# Patient Record
Sex: FEMALE | Race: WHITE | NOT HISPANIC OR LATINO | Employment: FULL TIME | URBAN - METROPOLITAN AREA
[De-identification: names, ages, dates, MRNs, and addresses within clinical notes are randomized per-mention and may not be internally consistent; named-entity substitution may affect disease eponyms.]

---

## 2018-08-06 ENCOUNTER — OFFICE VISIT (OUTPATIENT)
Dept: MEDICAL GROUP | Age: 18
End: 2018-08-06
Payer: COMMERCIAL

## 2018-08-06 VITALS
BODY MASS INDEX: 20.62 KG/M2 | HEART RATE: 87 BPM | HEIGHT: 61 IN | SYSTOLIC BLOOD PRESSURE: 114 MMHG | WEIGHT: 109.2 LBS | DIASTOLIC BLOOD PRESSURE: 80 MMHG | OXYGEN SATURATION: 96 % | TEMPERATURE: 98.3 F

## 2018-08-06 DIAGNOSIS — R11.2 NON-INTRACTABLE VOMITING WITH NAUSEA, UNSPECIFIED VOMITING TYPE: ICD-10-CM

## 2018-08-06 DIAGNOSIS — F43.10 PTSD (POST-TRAUMATIC STRESS DISORDER): ICD-10-CM

## 2018-08-06 DIAGNOSIS — R63.0 ANOREXIA: ICD-10-CM

## 2018-08-06 DIAGNOSIS — Z13.220 LIPID SCREENING: ICD-10-CM

## 2018-08-06 DIAGNOSIS — R10.13 EPIGASTRIC PAIN: ICD-10-CM

## 2018-08-06 PROCEDURE — 99214 OFFICE O/P EST MOD 30 MIN: CPT | Performed by: PHYSICIAN ASSISTANT

## 2018-08-06 RX ORDER — ONDANSETRON 2 MG/ML
4 INJECTION INTRAMUSCULAR; INTRAVENOUS ONCE
Status: COMPLETED | OUTPATIENT
Start: 2018-08-06 | End: 2018-08-06

## 2018-08-06 RX ORDER — ONDANSETRON 4 MG/1
4 TABLET, FILM COATED ORAL EVERY 4 HOURS PRN
Qty: 20 TAB | Refills: 0 | Status: SHIPPED | OUTPATIENT
Start: 2018-08-06 | End: 2019-06-24

## 2018-08-06 RX ADMIN — ONDANSETRON 4 MG: 2 INJECTION INTRAMUSCULAR; INTRAVENOUS at 14:04

## 2018-08-06 ASSESSMENT — PATIENT HEALTH QUESTIONNAIRE - PHQ9
SUM OF ALL RESPONSES TO PHQ QUESTIONS 1-9: 7
CLINICAL INTERPRETATION OF PHQ2 SCORE: 2
5. POOR APPETITE OR OVEREATING: 1 - SEVERAL DAYS
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
## 2018-08-06 NOTE — LETTER
August 6, 2018         Patient: Aziza Dutta   YOB: 2000   Date of Visit: 8/6/2018           To Whom it May Concern:    Aziza Dutta was seen in my clinic on 8/6/2018. She may return to work on 8/8/18.    If you have any questions or concerns, please don't hesitate to call.        Sincerely,           Janiya Desai P.A.-C.  Electronically Signed

## 2018-08-09 DIAGNOSIS — R79.89 ELEVATED TSH: ICD-10-CM

## 2018-08-11 PROBLEM — F50.00 ANOREXIA NERVOSA WITHOUT BULIMIA: Status: ACTIVE | Noted: 2018-08-11

## 2018-08-11 PROBLEM — R63.0 ANOREXIA: Status: ACTIVE | Noted: 2018-08-11

## 2018-08-11 PROBLEM — F43.10 PTSD (POST-TRAUMATIC STRESS DISORDER): Status: ACTIVE | Noted: 2018-08-11

## 2018-08-13 ENCOUNTER — TELEPHONE (OUTPATIENT)
Dept: MEDICAL GROUP | Age: 18
End: 2018-08-13

## 2018-08-13 NOTE — TELEPHONE ENCOUNTER
Phone Number Called: [phone] (home)       Message: Called LVM for patient to inform of additional lab that need to be done prior to next appointment on 8/20/18. Lab slips mailed to patients home, I also sent patient a my chart message to inform of lul may message.     Left Message for patient to call back: yes

## 2018-08-13 NOTE — TELEPHONE ENCOUNTER
----- Message from Janiya Desai P.A.-C. sent at 8/9/2018  1:54 PM PDT -----  Please call pt and let her know that her labs overall looked good. We'll discuss them further at her appointment on 8/20/18. However, I would like her to complete one more follow-up lab for me (she doesn't need to fast). Please mail her the lab orders or see if she would like to pick them up as she has to go to [employer].

## 2018-08-20 ENCOUNTER — APPOINTMENT (OUTPATIENT)
Dept: MEDICAL GROUP | Age: 18
End: 2018-08-20
Payer: COMMERCIAL

## 2018-08-31 ENCOUNTER — OFFICE VISIT (OUTPATIENT)
Dept: MEDICAL GROUP | Age: 18
End: 2018-08-31
Payer: COMMERCIAL

## 2018-08-31 VITALS
HEART RATE: 80 BPM | DIASTOLIC BLOOD PRESSURE: 74 MMHG | BODY MASS INDEX: 20.58 KG/M2 | WEIGHT: 109 LBS | OXYGEN SATURATION: 97 % | HEIGHT: 61 IN | SYSTOLIC BLOOD PRESSURE: 112 MMHG | TEMPERATURE: 98.5 F

## 2018-08-31 DIAGNOSIS — R63.0 ANOREXIA: ICD-10-CM

## 2018-08-31 DIAGNOSIS — Z23 NEED FOR MENINGOCOCCAL VACCINATION: ICD-10-CM

## 2018-08-31 DIAGNOSIS — Z23 NEED FOR HPV VACCINATION: ICD-10-CM

## 2018-08-31 DIAGNOSIS — E03.8 SUBCLINICAL HYPOTHYROIDISM: Primary | ICD-10-CM

## 2018-08-31 PROCEDURE — 90734 MENACWYD/MENACWYCRM VACC IM: CPT | Performed by: FAMILY MEDICINE

## 2018-08-31 PROCEDURE — 90472 IMMUNIZATION ADMIN EACH ADD: CPT | Performed by: FAMILY MEDICINE

## 2018-08-31 PROCEDURE — 90471 IMMUNIZATION ADMIN: CPT | Performed by: FAMILY MEDICINE

## 2018-08-31 PROCEDURE — 90651 9VHPV VACCINE 2/3 DOSE IM: CPT | Performed by: FAMILY MEDICINE

## 2018-08-31 PROCEDURE — 99203 OFFICE O/P NEW LOW 30 MIN: CPT | Mod: 25 | Performed by: FAMILY MEDICINE

## 2018-09-02 NOTE — PROGRESS NOTES
"Subjective:   CC: lab review    HPI:     Aziza Dutta is a 18 y.o. female, established patient of the clinic, presents with the following concerns:     Pt is a 17 yo female with hx of anorexia who presents to discuss recent blood tests which show mildy suppressed TSH with normal T4. She denies familial hx of thyroid dysfunction, unexplained weight loss, chronic diarrhea, ovulatory dysfunctions, heat intolerance. She feels well. She states that she tries to maintain healthy weight by \"starving herself\" and has only one small meal daily as she hates being \"fat\". She denies bulimia. She was referred to behavioral counseling. She is planning to schedule appointment with behavioral therapist.     Current medicines (including changes today)  Current Outpatient Prescriptions   Medication Sig Dispense Refill   • ondansetron (ZOFRAN) 4 MG Tab tablet Take 1 Tab by mouth every four hours as needed for Nausea/Vomiting. 20 Tab 0   • sertraline (ZOLOFT) 100 MG Tab Take 1 Tab by mouth every day. 30 Tab 1     No current facility-administered medications for this visit.      She  has a past medical history of Anorexia nervosa without bulimia (8/11/2018).    I personally reviewed patient's problem list, allergies, medications, family hx, social hx with patient and update EPIC.     REVIEW OF SYSTEMS:  CONSTITUTIONAL:  Denies night sweats, fatigue, malaise, lethargy, fever or chills.  RESPIRATORY:  Denies cough, wheeze, hemoptysis, or shortness of breath.  CARDIOVASCULAR:  Denies chest pains, palpitations, pedal edema     Objective:     Blood pressure 112/74, pulse 80, temperature 36.9 °C (98.5 °F), height 1.549 m (5' 0.98\"), weight 49.4 kg (109 lb), last menstrual period 08/20/2018, SpO2 97 %, not currently breastfeeding. Body mass index is 20.61 kg/m².    Physical Exam:  Constitutional: awake, alert, in no distress, well-nourished female with healthy musculoskeletal development.   Skin: Warm, dry, good turgor, no rashes, bruises, " ulcers in visible areas.  Neck: Trachea midline, no masses, no thyromegaly. No cervical or supraclavicular lymphadenopathy  Respiratory: Unlabored respiratory effort, lungs clear to auscultation, no wheezes, no rales.  Cardiovascular: Normal S1, S2, no murmur, no pedal edema.  Abdomen: Soft, non-tender to palpation, active BS, no hernia, no hepatosplenomegaly, negative rebound or guarding.   Psych: Oriented x3, affect and mood wnl, intact judgement and insight.       Assessment and Plan:   The following treatment plan was discussed    1. Subclinical hypothyroidism  Recent blood tests notable for mildly suppressed TSH with normal T4. Pt is asymptomatic. Recommended observation and routine monitoring of thyroid functions. S/s of hyperthyroidism discussed with pt. She is advised to seek medical attn should these symptoms develop. Plans:   - TSH; Future  - FREE THYROXINE; Future  - TRIIDOTHYRONINE; Future  - TSH RECEPTOR ANTIBODY; Future    2. Anorexia  Hx of Anorexia, denies bulimia, tries to eat better, feel healthy, BMI 20.61 today. Exam noted for well nourished female with healthy musculoskeletal development. Plans:   - F/u with therapist.     3. Need for HPV vaccination  - 9VHPV VACCINE 2-3 DOSE IM    4. Need for meningococcal vaccination  - Meningococcal Conjugate Vaccine 4-Valent IM        Gricel Schilling M.D.      Followup: Return for As needed.    Please note that this dictation was created using voice recognition software. I have made every reasonable attempt to correct obvious errors, but I expect that there are errors of grammar and possibly content that I did not discover before finalizing the note.

## 2019-02-26 ENCOUNTER — OFFICE VISIT (OUTPATIENT)
Dept: MEDICAL GROUP | Age: 19
End: 2019-02-26

## 2019-02-26 VITALS
OXYGEN SATURATION: 98 % | WEIGHT: 113.2 LBS | BODY MASS INDEX: 21.37 KG/M2 | TEMPERATURE: 97.8 F | SYSTOLIC BLOOD PRESSURE: 94 MMHG | HEART RATE: 80 BPM | HEIGHT: 61 IN | DIASTOLIC BLOOD PRESSURE: 60 MMHG

## 2019-02-26 DIAGNOSIS — L03.213 PRESEPTAL CELLULITIS OF RIGHT UPPER EYELID: ICD-10-CM

## 2019-02-26 PROCEDURE — 99203 OFFICE O/P NEW LOW 30 MIN: CPT | Performed by: INTERNAL MEDICINE

## 2019-02-26 RX ORDER — SULFACETAMIDE SODIUM 100 MG/ML
1 SOLUTION/ DROPS OPHTHALMIC
Qty: 1 BOTTLE | Refills: 0 | Status: SHIPPED | OUTPATIENT
Start: 2019-02-26 | End: 2019-06-24

## 2019-02-26 RX ORDER — AMOXICILLIN AND CLAVULANATE POTASSIUM 875; 125 MG/1; MG/1
1 TABLET, FILM COATED ORAL 2 TIMES DAILY
Qty: 20 TAB | Refills: 1 | Status: SHIPPED | OUTPATIENT
Start: 2019-02-26 | End: 2019-06-24

## 2019-02-26 ASSESSMENT — ENCOUNTER SYMPTOMS
NEUROLOGICAL NEGATIVE: 1
CONSTITUTIONAL NEGATIVE: 1
MUSCULOSKELETAL NEGATIVE: 1
EYES NEGATIVE: 1
GASTROINTESTINAL NEGATIVE: 1
RESPIRATORY NEGATIVE: 1
CARDIOVASCULAR NEGATIVE: 1
PSYCHIATRIC NEGATIVE: 1

## 2019-02-26 ASSESSMENT — PATIENT HEALTH QUESTIONNAIRE - PHQ9: CLINICAL INTERPRETATION OF PHQ2 SCORE: 0

## 2019-02-26 NOTE — PROGRESS NOTES
"Subjective:   This is a new patient to me unable see PCP today.  Aziza Dutta is a 19 y.o. female who presents with Blepharitis (x 1 day, pain in eye, swelling of eyelids)        HPI    The patient is here for followup of chronic medical problems listed below. The patient is compliant with medications and having no side effects from them. Denies chest pain, abdominal pain, dyspnea, myalgias, or cough.    Patient complains of some redness on her right eyelid for one day. She states it is slightly tender. She denies drainage or discharge from the eye.    Patient Active Problem List   Diagnosis   • Anorexia   • PTSD (post-traumatic stress disorder)       Outpatient Medications Prior to Visit   Medication Sig Dispense Refill   • ondansetron (ZOFRAN) 4 MG Tab tablet Take 1 Tab by mouth every four hours as needed for Nausea/Vomiting. (Patient not taking: Reported on 2/26/2019) 20 Tab 0   • sertraline (ZOLOFT) 100 MG Tab Take 1 Tab by mouth every day. (Patient not taking: Reported on 2/26/2019) 30 Tab 1     No facility-administered medications prior to visit.         No Known Allergies    Review of Systems   Constitutional: Negative.    HENT: Negative.    Eyes: Negative.    Respiratory: Negative.    Cardiovascular: Negative.    Gastrointestinal: Negative.    Genitourinary: Negative.    Musculoskeletal: Negative.    Skin:        Redness over right eyelid   Neurological: Negative.    Endo/Heme/Allergies: Negative.    Psychiatric/Behavioral: Negative.    All other systems reviewed and are negative.           Objective:     BP (!) 94/60 (BP Location: Left arm, Patient Position: Sitting)   Pulse 80   Temp 36.6 °C (97.8 °F) (Temporal)   Ht 1.549 m (5' 1\")   Wt 51.3 kg (113 lb 3.2 oz)   SpO2 98%   BMI 21.39 kg/m²     Physical Exam   Constitutional: Oriented to person, place, and time. Appears well-developed and well-nourished. No distress.   Head: Normocephalic and atraumatic.   Right Ear: External ear normal.   Left Ear: " External ear normal.   Nose: Nose normal.   Mouth/Throat: Oropharynx is clear and moist. No oropharyngeal exudate.   Eyes: Pupils are equal, round, and reactive to light. Conjunctivae and EOM are normal. Right eye exhibits no discharge. Left eye exhibits no discharge. No scleral icterus.   Neck: Normal range of motion. Neck supple. No JVD present. No tracheal deviation present. No thyromegaly present.   Cardiovascular: Normal rate, regular rhythm, normal heart sounds and intact distal pulses.  Exam reveals no gallop and no friction rub.    No murmur heard.  Pulmonary/Chest: Effort normal. No stridor. No respiratory distress. No wheezing or rales. No tenderness.   Abdominal: Soft. Bowel sounds are normal. No distension and no mass. There is no tenderness. There is no rebound and no guarding. No hernia.   Musculoskeletal: Normal range of motion No edema or tenderness.   Lymphadenopathy: No cervical adenopathy.   Neurological: Alert and oriented to person, place, and time. Normal reflexes. Normal reflexes. No cranial nerve deficit. Normal muscle tone. Coordination normal.   Skin: Skin is warm and dry. Not diaphoretic. No pallor. Right upper eyelid swollen red and slightly tender. No conjunctival discharge or conjunctival inflammation  Psychiatric: Normal mood and affect. Behavior is normal. Judgment and thought content normal.   Nursing note and vitals reviewed.      No results found for: HBA1C  No results found for: SODIUM, POTASSIUM, CHLORIDE, CO2, GLUCOSE, BUN, CREATININE, BUNCREATRAT, GLOMRATE, ALKPHOSPHAT, ASTSGOT, ALTSGPT, TBILIRUBIN, ALB  No results found for: INR  No results found for: CHOLSTRLTOT, LDL, HDL, TRIGLYCERIDE    No results found for: TESTOSTERONE  No results found for: TSH  No results found for: FREET4  No results found for: URICACID  No components found for: VITB12  No results found for: 25HYDROXY       Assessment/Plan:       1. Preseptal cellulitis of right upper eyelid  Patient reports redness  that began this morning. Plan to treat with:    - amoxicillin-clavulanate (AUGMENTIN) 875-125 MG Tab; Take 1 Tab by mouth 2 times a day.  Dispense: 20 Tab; Refill: 1  - sulfacetamide (SULAMYD) 10 % Solution; Place 1 Drop in both eyes every 3 hours.  Dispense: 1 Bottle; Refill: 0         30 minute face-to-face encounter took place today.  More than half of this time was spent in the coordination of care of the above problems, as well as counseling.     Leeann BOCANEGRA (Scribe), am scribing for, and in the presence of, Bandar Smiley M.D..    Electronically signed by: Leeann Rivas ([place]), 2/26/2019    Bandar BOCANEGRA M.D., personally performed the services described in this documentation, as scribed by Leeann Rivas in my presence, and it is both accurate and complete.

## 2019-06-24 ENCOUNTER — OFFICE VISIT (OUTPATIENT)
Dept: MEDICAL GROUP | Age: 19
End: 2019-06-24
Payer: COMMERCIAL

## 2019-06-24 VITALS
DIASTOLIC BLOOD PRESSURE: 82 MMHG | WEIGHT: 114.4 LBS | HEART RATE: 79 BPM | TEMPERATURE: 97.9 F | SYSTOLIC BLOOD PRESSURE: 120 MMHG | OXYGEN SATURATION: 99 % | HEIGHT: 61 IN | BODY MASS INDEX: 21.6 KG/M2

## 2019-06-24 DIAGNOSIS — Z23 NEED FOR VACCINATION: ICD-10-CM

## 2019-06-24 DIAGNOSIS — R63.0 ANOREXIA: ICD-10-CM

## 2019-06-24 DIAGNOSIS — N63.20 LEFT BREAST LUMP: ICD-10-CM

## 2019-06-24 DIAGNOSIS — R25.2 LEG CRAMPING: ICD-10-CM

## 2019-06-24 PROCEDURE — 99214 OFFICE O/P EST MOD 30 MIN: CPT | Mod: 25 | Performed by: PHYSICIAN ASSISTANT

## 2019-06-24 PROCEDURE — 90471 IMMUNIZATION ADMIN: CPT | Performed by: PHYSICIAN ASSISTANT

## 2019-06-24 PROCEDURE — 90651 9VHPV VACCINE 2/3 DOSE IM: CPT | Performed by: PHYSICIAN ASSISTANT

## 2019-06-24 NOTE — PROGRESS NOTES
Subjective:     Chief Complaint   Patient presents with   • Breast Problem     x1 month lump on left side      Aziza Dutta is a 19 y.o. female here today for evaluation and management of:    Left breast lump  New problem.  Reports she first noticed about 1 month ago.  Lump is not tender, however, both breasts are sensitive.  No nipple discharge.    Leg cramping  Reports new leg cramping at night. Feels she needs to move legs for it to resolve.  Reports minimal water intake. She is taking a multivitamin.   No palpitations.     Anorexia  Reports she is consistently eating two meals per day. Feels eating disorder is well managed.   Not taking any medications.  Reports weight gain and OK with it.    Wt Readings from Last 5 Encounters:   06/24/19 51.9 kg (114 lb 6.4 oz)   02/26/19 51.3 kg (113 lb 3.2 oz)   08/31/18 49.4 kg (109 lb)   08/06/18 49.5 kg (109 lb 3.2 oz)   09/15/15 53.8 kg (118 lb 9.6 oz) (41 %, Z= -0.23)*     * Growth percentiles are based on CDC 2-20 Years data.        ROS   Denies any recent fevers or chills. No nausea or vomiting. No diarrhea. No chest pains or shortness of breath. No lower extremity edema.    No Known Allergies    Current medicines (including changes today)  No current outpatient prescriptions on file.     No current facility-administered medications for this visit.        Patient Active Problem List    Diagnosis Date Noted   • Left breast lump 06/24/2019   • Anorexia 08/11/2018   • PTSD (post-traumatic stress disorder) 08/11/2018       Family History   Problem Relation Age of Onset   • No Known Problems Mother    • Alcohol/Drug Father         drugs hx   • Heart Disease Paternal Grandfather         CABG   • Stroke Paternal Grandfather    • Hyperlipidemia Paternal Grandfather    • Hypertension Paternal Grandfather    • Cancer Paternal Grandfather         Lung with brain mets   • Cancer Paternal Grandmother 40        breast   • Diabetes Neg Hx           Objective:     Vitals:     "06/24/19 0721   BP: 120/82   BP Location: Left arm   Patient Position: Sitting   BP Cuff Size: Adult   Pulse: 79   Temp: 36.6 °C (97.9 °F)   TempSrc: Temporal   SpO2: 99%   Weight: 51.9 kg (114 lb 6.4 oz)   Height: 1.549 m (5' 1\")     Body mass index is 21.62 kg/m².     Physical Exam:  Gen: Well developed, well nourished in no acute distress.   Skin: Pink, warm, and dry  HEENT: conjunctiva non-injected, sclera non-icteric. EOMs intact.   Neck: Supple, trachea midline. No adenopathy or masses in the neck or supraclavicular regions.  Lungs: Effort is normal. Clear to auscultation bilaterally with good excursion.  CV: regular rate and rhythm.  Breast: No skin changes, peau d'orange or nipple retraction. No nipple discharge. No axillary or supraclavicular adenopathy. Left breast with 0.5 cm diameter at 12 o'clock 1 cm from nipple and at 3 O'clock also 0.5 cm in diameter, however, 0.5 cm from nipple. -- both round, soft with well defined borders.  Ext: no edema, color normal, vascularity normal, temperature normal  Alert and oriented Eye contact is good, speech goal directed, affect calm    Assessment and Plan:   The following treatment plan was discussed:     1. Left breast lump - Likely fibrocystic changes. Will reassess in 1 month with pap. - pt provided with additional education material on AVS    2. Leg cramping - encouraged her to increase water intake. Take magnesium at night time PRN Discussed potential side effects of medication with patient.  Will follow-up in 1 month.    3. Anorexia- Stable. Continue to monitor weight in office.    4. Need for vaccination -VIS given. Informed consent obtained. Vaccine administered in office. 9VHPV Vaccine 2-3 Dose IM     - HM: Pap with STD check in 1 month; Will start Trumenba at that time as well.   -Any change or worsening of signs or symptoms, patient encouraged to follow-up or report to emergency room for further evaluation. Patient verbalizes understanding and " agrees.    Followup: Return in about 1 month (around 7/24/2019) for Pap, sooner if needed.

## 2019-06-24 NOTE — PATIENT INSTRUCTIONS
Fibrocystic Breast Changes  Fibrocystic breast changes are changes that can make your breasts swollen or painful. These changes happen when tiny sacs of fluid (cysts) form in the breast. This is a common condition. It does not mean that you have cancer. It usually happens because of hormone changes during a monthly period.  Follow these instructions at home:  · Check your breasts after every monthly period. If you do not have monthly periods, check your breasts on the first day of every month. Check for:  ¨ Soreness.  ¨ New swelling or puffiness.  ¨ A change in breast size.  ¨ A change in a lump that was already there.  · Take over-the-counter and prescription medicines only as told by your doctor.  · Wear a support or sports bra that fits well. Wear this support especially when you are exercising.  · Avoid or have less caffeine, fat, and sugar in what you eat and drink as told by your doctor.  Contact a doctor if:  · You have fluid coming from your nipple, especially if the fluid has blood in it.  · You have new lumps or bumps in your breast.  · Your breast gets puffy, red, and painful.  · You have changes in how your breast looks.  · Your nipple looks flat or it sinks into your breast.  Get help right away if:  · Your breast turns red, and the redness is spreading.  Summary  · Fibrocystic breast changes are changes that can make your breasts swollen or painful.  · This condition can happen when you have hormone changes during your monthly period.  · With this condition, it is important to check your breasts after every monthly period. If you do not have monthly periods, check your breasts on the first day of every month.  This information is not intended to replace advice given to you by your health care provider. Make sure you discuss any questions you have with your health care provider.  Document Released: 11/30/2009 Document Revised: 08/31/2017 Document Reviewed: 08/31/2017  [employer] Patient  Education © 2017 [employer]

## 2022-11-10 ENCOUNTER — NON-PROVIDER VISIT (OUTPATIENT)
Dept: URGENT CARE | Facility: PHYSICIAN GROUP | Age: 22
End: 2022-11-10

## 2022-11-10 DIAGNOSIS — Z02.1 PRE-EMPLOYMENT DRUG SCREENING: ICD-10-CM

## 2022-11-10 LAB
AMP AMPHETAMINE: NORMAL
BAR BARBITURATES: NORMAL
BZO BENZODIAZEPINES: NORMAL
COC COCAINE: NORMAL
INT CON NEG: NORMAL
INT CON POS: NORMAL
MDMA ECSTASY: NORMAL
MET METHAMPHETAMINES: NORMAL
MTD METHADONE: NORMAL
OPI OPIATES: NORMAL
OXY OXYCODONE: NORMAL
PCP PHENCYCLIDINE: NORMAL
POC URINE DRUG SCREEN OCDRS: NEGATIVE
THC: NORMAL

## 2022-11-10 PROCEDURE — 80305 DRUG TEST PRSMV DIR OPT OBS: CPT | Performed by: FAMILY MEDICINE

## 2024-09-16 ENCOUNTER — OFFICE VISIT (OUTPATIENT)
Dept: URGENT CARE | Facility: PHYSICIAN GROUP | Age: 24
End: 2024-09-16
Payer: COMMERCIAL

## 2024-09-16 VITALS
HEART RATE: 92 BPM | WEIGHT: 114.86 LBS | BODY MASS INDEX: 21.69 KG/M2 | SYSTOLIC BLOOD PRESSURE: 100 MMHG | DIASTOLIC BLOOD PRESSURE: 64 MMHG | OXYGEN SATURATION: 99 % | HEIGHT: 61 IN | TEMPERATURE: 97.8 F | RESPIRATION RATE: 14 BRPM

## 2024-09-16 DIAGNOSIS — L98.9 LEG LESION: ICD-10-CM

## 2024-09-16 PROCEDURE — 99203 OFFICE O/P NEW LOW 30 MIN: CPT | Performed by: FAMILY MEDICINE

## 2024-09-16 PROCEDURE — 3078F DIAST BP <80 MM HG: CPT | Performed by: FAMILY MEDICINE

## 2024-09-16 PROCEDURE — 3074F SYST BP LT 130 MM HG: CPT | Performed by: FAMILY MEDICINE

## 2024-09-16 RX ORDER — PREDNISONE 20 MG/1
40 TABLET ORAL DAILY
Qty: 10 TABLET | Refills: 0 | Status: SHIPPED | OUTPATIENT
Start: 2024-09-16 | End: 2024-09-21

## 2024-09-16 RX ORDER — TRIAMCINOLONE ACETONIDE 1 MG/G
OINTMENT TOPICAL
Qty: 30 G | Refills: 0 | Status: SHIPPED | OUTPATIENT
Start: 2024-09-16

## 2024-09-16 RX ORDER — SULFAMETHOXAZOLE/TRIMETHOPRIM 800-160 MG
1 TABLET ORAL EVERY 12 HOURS
Qty: 10 TABLET | Refills: 0 | Status: SHIPPED | OUTPATIENT
Start: 2024-09-16 | End: 2024-09-21

## 2024-09-16 ASSESSMENT — ENCOUNTER SYMPTOMS
NAUSEA: 0
EYE REDNESS: 0
EYE DISCHARGE: 0
WEIGHT LOSS: 0
MYALGIAS: 0
VOMITING: 0

## 2024-09-16 NOTE — PROGRESS NOTES
"Subjective     Aziza Dutta is a 24 y.o. female who presents with Sore (Sores on legs b/l in front and back of calves x3 weeks. Has been itching. No visible sores, but she can feel it when pressing on her legs.  No meds tried. )            3 weeks intermittent leg itching.  She has had few sore bumps under the skin.  No drainage.  No blisters.  No fever.  No prodrome.  No clear trigger or contact cause.  No oral lesions.  No scalp lesions.  No other aggravating or alleviating factors.        Review of Systems   Constitutional:  Negative for malaise/fatigue and weight loss.   Eyes:  Negative for discharge and redness.   Gastrointestinal:  Negative for nausea and vomiting.   Musculoskeletal:  Negative for joint pain and myalgias.   Skin:  Negative for itching and rash.              Objective     /64 (BP Location: Right arm, Patient Position: Sitting, BP Cuff Size: Small adult)   Pulse 92   Temp 36.6 °C (97.8 °F) (Temporal)   Resp 14   Ht 1.549 m (5' 1\") Comment: Pt reported  Wt 52.1 kg (114 lb 13.8 oz)   SpO2 99%   BMI 21.70 kg/m²      Physical Exam  Constitutional:       General: She is not in acute distress.     Appearance: She is well-developed.   HENT:      Head: Normocephalic and atraumatic.   Eyes:      Conjunctiva/sclera: Conjunctivae normal.   Cardiovascular:      Rate and Rhythm: Normal rate and regular rhythm.      Heart sounds: Normal heart sounds. No murmur heard.  Pulmonary:      Effort: Pulmonary effort is normal.      Breath sounds: Normal breath sounds. No wheezing.   Skin:     General: Skin is warm and dry.      Comments: Few circumscribed tender swollen areas to legs and lower thighs.  Mild skin changes brown to red.  No fluctuance.  No vesicles.  No scale.  No central clearing.   Neurological:      Mental Status: She is alert.                             Assessment & Plan        1. Leg lesion  triamcinolone acetonide (KENALOG) 0.1 % Ointment    predniSONE (DELTASONE) 20 MG Tab    " sulfamethoxazole-trimethoprim (BACTRIM DS) 800-160 MG tablet        Differential diagnosis, natural history, supportive care, and indications for immediate follow-up were discussed.     I suspect erythema nodosum.  There is concern for both allergic and infectious causes. I think unusual to have significant itching with EN. Initially I would like her to try short course of oral and topical corticosteroid.  If no improvement or worsening oral antibiotic.